# Patient Record
Sex: FEMALE
[De-identification: names, ages, dates, MRNs, and addresses within clinical notes are randomized per-mention and may not be internally consistent; named-entity substitution may affect disease eponyms.]

---

## 2021-07-07 PROBLEM — 85005007: Status: ACTIVE | Noted: 2021-07-07

## 2021-07-07 PROBLEM — 422587007: Status: ACTIVE | Noted: 2021-07-07

## 2021-07-08 ENCOUNTER — DASHBOARD ENCOUNTERS (OUTPATIENT)
Age: 21
End: 2021-07-08

## 2021-07-08 ENCOUNTER — OFFICE VISIT (OUTPATIENT)
Dept: URBAN - METROPOLITAN AREA CLINIC 37 | Facility: CLINIC | Age: 21
End: 2021-07-08

## 2021-07-08 VITALS
HEART RATE: 61 BPM | TEMPERATURE: 97.5 F | BODY MASS INDEX: 21.83 KG/M2 | OXYGEN SATURATION: 99 % | HEIGHT: 58 IN | WEIGHT: 104 LBS

## 2021-07-08 PROBLEM — 698861005: Status: ACTIVE | Noted: 2021-07-08

## 2021-07-08 PROBLEM — 54586004: Status: ACTIVE | Noted: 2021-07-07

## 2021-07-08 PROBLEM — 428819003: Status: ACTIVE | Noted: 2021-07-07

## 2021-07-08 PROBLEM — 428823006: Status: ACTIVE | Noted: 2021-07-08

## 2021-07-08 PROBLEM — 218791008: Status: ACTIVE | Noted: 2021-07-08

## 2021-07-08 RX ORDER — POLYETHYLENE GLYCOL 3350 17 G/17G
AS DIRECTED POWDER, FOR SOLUTION ORAL
Status: ACTIVE | COMMUNITY

## 2021-07-08 RX ORDER — PROMETHAZINE HYDROCHLORIDE 25 MG/1
1 SUPPOSITORY AS NEEDED SUPPOSITORY RECTAL
Qty: 60 | Refills: 1 | OUTPATIENT
Start: 2021-07-08

## 2021-07-08 RX ORDER — DICYCLOMINE HYDROCHLORIDE 20 MG/1
1 TABLET TABLET ORAL THREE TIMES A DAY
Qty: 90 | Refills: 1 | OUTPATIENT
Start: 2021-07-08

## 2021-07-08 RX ORDER — OMEPRAZOLE 20 MG/1
1 CAPSULE CAPSULE, DELAYED RELEASE ORAL ONCE A DAY
Qty: 90 CAPSULE | Status: ON HOLD | COMMUNITY

## 2021-07-08 RX ORDER — DICYCLOMINE HYDROCHLORIDE 20 MG/1
1 TABLET TABLET ORAL
Status: ON HOLD | COMMUNITY

## 2021-07-08 RX ORDER — PROMETHAZINE HYDROCHLORIDE 25 MG/1
1 TABLET AS NEEDED TABLET ORAL
Qty: 60 | Refills: 1 | OUTPATIENT
Start: 2021-07-08

## 2021-07-08 RX ORDER — ONDANSETRON 4 MG/1
1 TABLET ON THE TONGUE AND ALLOW TO DISSOLVE TABLET, ORALLY DISINTEGRATING ORAL
Status: ON HOLD | COMMUNITY

## 2021-07-08 RX ORDER — ACETAMINOPHEN AND CODEINE PHOSPHATE 300; 15 MG/1; MG/1
1 TABLET AS NEEDED TABLET ORAL
Status: ON HOLD | COMMUNITY

## 2021-07-08 NOTE — HPI-MIGRATED HPI
Initial consultation : Patient is here for -> Abdominal pain. n/v  Onset of symptoms: Patient reports "stomach issues" since she was 13 years old  Patient had been seen in the hospital in three different occasions:   1. Patient was initally seen 06/25/2021 at [facility]  Patient had a CT Abdomen/Pelvis which showed no acute abnormality   * CT Abdomen/Pelvis w/ Contrast on 06/25/2021:  1. Liver, gallbladder, spleen, pancreas and adrenal glands are unremarkable 2. No acute abdominal or pelvis abnormality   Labs during admission:  - CBC: WBC: 12.3; RBC: 5.62; Hgb: 17.0; Hct: 49.3; Plt: 329 - CMP: Glu: 93; BUN: 9; Cr: 0.6; Na: 140; K: 3.7; ALT: 22; AST: 25; ALP: 80; Tbili: 0.5  Patient was discharged and prescribed Priosec 20 mg daily + Zofran 4 mg Q 8 hours + Codine.  2. Subsequently was seen on 06/27/2021 due to unresolved symptoms. Patient was given Haldol and was able to tolerate crackers and fluid. Patient was discharged and in addition to Prilolsec and Zofran, she was prescribed Carafate 1 gm BID x 2 months.   3. Patient most recent hospital visit was on 07/02/2021 at [facility] ER via ambulance due to nausea, vomiting and abdominal cramping.  Patient admits daily maijuana use?? Patient recieved Zofran IV and Bentyl 20 mg IM injection  Urine drug screen showed: positive for Marijuna and Opiate  Patient was advised to discontinue marijuana use and was prescribed Dicyclomine 20 mg QID PRN for abdominal cramping   Patient was previously following up with Dr. Vandana Ramirez at [facility].  Patient had and EGD done 06/30/2021, the esophagus was normal. The stomach was normal with bxx showing unremarkable oxyntic mucosa. No evidence of H. pylori. Normal duodenum  Current symptoms: still vomit with foammy , bile ;

## 2021-07-20 PROBLEM — 37344009: Status: ACTIVE | Noted: 2021-07-07

## 2021-07-22 ENCOUNTER — OFFICE VISIT (OUTPATIENT)
Dept: URBAN - METROPOLITAN AREA CLINIC 37 | Facility: CLINIC | Age: 21
End: 2021-07-22

## 2021-07-22 RX ORDER — DICYCLOMINE HYDROCHLORIDE 20 MG/1
1 TABLET TABLET ORAL THREE TIMES A DAY
Qty: 90 | Refills: 1 | OUTPATIENT

## 2021-07-22 RX ORDER — PROMETHAZINE HYDROCHLORIDE 25 MG/1
1 TABLET AS NEEDED TABLET ORAL
Qty: 60 | Refills: 1 | OUTPATIENT

## 2021-07-22 RX ORDER — PROMETHAZINE HYDROCHLORIDE 25 MG/1
1 TABLET AS NEEDED TABLET ORAL
Qty: 60 | Refills: 1 | Status: ACTIVE | COMMUNITY
Start: 2021-07-08

## 2021-07-22 RX ORDER — PROMETHAZINE HYDROCHLORIDE 25 MG/1
1 SUPPOSITORY AS NEEDED SUPPOSITORY RECTAL
Qty: 60 | Refills: 1 | OUTPATIENT

## 2021-07-22 RX ORDER — POLYETHYLENE GLYCOL 3350 17 G/17G
AS DIRECTED POWDER, FOR SOLUTION ORAL
Status: ACTIVE | COMMUNITY

## 2021-07-22 RX ORDER — DICYCLOMINE HYDROCHLORIDE 20 MG/1
1 TABLET TABLET ORAL THREE TIMES A DAY
Qty: 90 | Refills: 1 | Status: ACTIVE | COMMUNITY
Start: 2021-07-08

## 2021-07-22 RX ORDER — OMEPRAZOLE 20 MG/1
1 CAPSULE CAPSULE, DELAYED RELEASE ORAL ONCE A DAY
Qty: 90 CAPSULE | Status: ON HOLD | COMMUNITY

## 2021-07-22 RX ORDER — PROMETHAZINE HYDROCHLORIDE 25 MG/1
1 SUPPOSITORY AS NEEDED SUPPOSITORY RECTAL
Qty: 60 | Refills: 1 | Status: ACTIVE | COMMUNITY
Start: 2021-07-08

## 2021-07-22 RX ORDER — DICYCLOMINE HYDROCHLORIDE 20 MG/1
1 TABLET TABLET ORAL
Status: ON HOLD | COMMUNITY

## 2021-07-22 RX ORDER — ONDANSETRON 4 MG/1
1 TABLET ON THE TONGUE AND ALLOW TO DISSOLVE TABLET, ORALLY DISINTEGRATING ORAL
Status: ON HOLD | COMMUNITY

## 2021-07-22 RX ORDER — ACETAMINOPHEN AND CODEINE PHOSPHATE 300; 15 MG/1; MG/1
1 TABLET AS NEEDED TABLET ORAL
Status: ON HOLD | COMMUNITY

## 2021-07-22 NOTE — HPI-MIGRATED HPI
Follow up OV : Patient is here for a routine OV for -> Vomiting/Nausea and LLQ abdominal pain;   Follow up OV : Last OV was -> 2 weeks ago Patient was previously following up with Dr. Vandana Ramirez at [facility].                  Patient had and EGD done 06/30/2021, the esophagus was normal. The stomach was normal with bxx showing unremarkable oxyntic mucosa. No evidence of H. pylori. Normal duodenum   Patient admits daily maijuana use;   Follow up OV : Current symptoms are -> ??;   Follow up OV : Patient is on -> Promethazine HCl 25 mg tablet daily + Promethazine 25 mg suppository daily  Also taking Dicyclomine 20 mg TID;

## 2025-02-13 ENCOUNTER — OFFICE VISIT (OUTPATIENT)
Dept: URBAN - METROPOLITAN AREA CLINIC 82 | Facility: CLINIC | Age: 25
End: 2025-02-13

## 2025-02-13 RX ORDER — POLYETHYLENE GLYCOL 3350 17 G/DOSE
AS DIRECTED POWDER (GRAM) ORAL
COMMUNITY

## 2025-02-13 RX ORDER — OMEPRAZOLE 20 MG/1
1 CAPSULE CAPSULE, DELAYED RELEASE ORAL ONCE A DAY
Qty: 90 CAPSULE | COMMUNITY

## 2025-02-13 RX ORDER — ONDANSETRON 4 MG/1
1 TABLET ON THE TONGUE AND ALLOW TO DISSOLVE TABLET, ORALLY DISINTEGRATING ORAL
COMMUNITY

## 2025-02-13 RX ORDER — PROMETHAZINE HYDROCHLORIDE 25 MG/1
1 TABLET AS NEEDED TABLET ORAL
Qty: 60 | Refills: 1 | COMMUNITY

## 2025-02-13 RX ORDER — ACETAMINOPHEN AND CODEINE PHOSPHATE 300; 15 MG/1; MG/1
1 TABLET AS NEEDED TABLET ORAL
COMMUNITY

## 2025-02-13 RX ORDER — PROMETHAZINE HYDROCHLORIDE 25 MG/1
1 SUPPOSITORY AS NEEDED SUPPOSITORY RECTAL
Qty: 60 | Refills: 1 | COMMUNITY

## 2025-02-13 RX ORDER — DICYCLOMINE HYDROCHLORIDE 20 MG/1
1 TABLET TABLET ORAL THREE TIMES A DAY
Qty: 90 | Refills: 1 | COMMUNITY